# Patient Record
(demographics unavailable — no encounter records)

---

## 2024-11-12 NOTE — REVIEW OF SYSTEMS
[Fatigue] : no fatigue [Dysphagia] : no dysphagia [Chest Pain] : no chest pain [Palpitations] : no palpitations [Shortness Of Breath] : no shortness of breath [Polyuria] : no polyuria [Polydipsia] : no polydipsia

## 2024-11-12 NOTE — HISTORY OF PRESENT ILLNESS
[FreeTextEntry1] : 77 y.o. Female with morbid obesity and PMHx of T2D, CAD, s/p PCI x 6, HLD, follows for DM management. Extensive FHx of Obesity, DM, CAD  Interval history: feeling well  T2DM- over 10 years Diet controlled and never required meds for diabetes HGA1C 7.0% Current FS: 108 banana 1 hour ago does not check glucose at home   Hypothyroidism (post MARTINEZ for ? graves - over 20 years ago) Levothyrxoine 112 mcg daily Patient advised to take every day in the morning, on an empty stomach, and with no other medications.   MNG s/p FNA 9/2023 - benign RMP nodule 2.4 x 1.6 x 2.3 cm, S/p FNA 8/2023 - benign There is another one in RMP 1.9 x 0.9 x 1.3 cm TR3  HLD LDL at goal On zetia On atorvastatin 80 mg daily  Vit D Def On no supplement No updated levels on labs  Vit B Def On b12 injections via hematology No updated levels on labs

## 2024-11-12 NOTE — ASSESSMENT
[Levothyroxine] : The patient was instructed to take Levothyroxine on an empty stomach, separate from vitamins, and wait at least 30 minutes before eating [FreeTextEntry1] : T2DM -Ok to maintain diet controlled -Explained to pt she must watch diet and increase exercise efforts   MNG s/p benign FNA -WIll continue to monitor annual sonograms -Due now for repeat u/s  Hypothyroidism -Continue LT4 112 mcg daily Patient advised to take every day in the morning, on an empty stomach, and with no other medications.  Continue zetia and statin    RTO 3 months NP and 6 months MD

## 2024-12-02 NOTE — ASSESSMENT
[FreeTextEntry1] : Check urinalysis  and urine culture. Start antibiotics. Increase fluids. Hygiene reviewed in regards to the bathroom and sexual intercourse. Monitor for worse symptoms of fever, chills, dysuria, hematuria, nausea, vomiting or back pain. Call the office or go the ER if worse.

## 2024-12-02 NOTE — REVIEW OF SYSTEMS
[Fatigue] : fatigue [Dysuria] : dysuria [Nocturia] : nocturia [Frequency] : frequency [FreeTextEntry8] : burning  blood in urine

## 2024-12-02 NOTE — HISTORY OF PRESENT ILLNESS
[FreeTextEntry8] : patient c/o UTI X 24 hours  burning upon urination, pressure, back pain, bloating, frequent trips to the restroom, very little output, blood when wiping  + chills   + nauseu mild

## 2024-12-02 NOTE — PHYSICAL EXAM
[Normal] : normal rate, regular rhythm, normal S1 and S2 and no murmur heard [de-identified] : + tender

## 2025-02-03 NOTE — REVIEW OF SYSTEMS
[Recent Weight Loss (___ Lbs)] : recent weight loss: [unfilled] lbs [Lower Ext Edema] : lower extremity edema [Fatigue] : no fatigue [Recent Weight Gain (___ Lbs)] : no recent weight gain [Visual Field Defect] : no visual field defect [Blurred Vision] : no blurred vision [Dysphagia] : no dysphagia [Neck Pain] : no neck pain [Dysphonia] : no dysphonia [Chest Pain] : no chest pain [Shortness Of Breath] : no shortness of breath [Constipation] : no constipation [Diarrhea] : no diarrhea [Polyuria] : no polyuria [Polydipsia] : no polydipsia [FreeTextEntry9] : +leg and feet pain

## 2025-02-03 NOTE — ASSESSMENT
[FreeTextEntry1] : T2DM -Need updated HGA1C -Unsure if diet controlled santosh continue to be appropriate -Explained to pt she must watch diet and advocate if she continues to require steroids/injections   MNG s/p benign FNA -WIll continue to monitor annual sonograms  Hypothyroidism -Continue LT4 112 mcg daily Patient advised to take every day in the morning, on an empty stomach, and with no other medications.  Continue zetia and statin  Need updated vit d and vit b levels- ok to do with next labs for 2025-once a year  Referall for podiatry given  Fasting labs due now RTO 3 months MD

## 2025-02-03 NOTE — HISTORY OF PRESENT ILLNESS
[FreeTextEntry1] : Interval history:  C.O leg edema States both vascular/ cardiology are aware of this Causing significant leg and feet pain  Suffering from rotator cuff tears, doesn't want surgery No longer doing PT  T2DM- over 10 years Diet controlled and never required meds for diabetes  HGA1C 7.0- 11/2024 FS in office 112- had a bagel this morning Morbidly obese 8/2024- denies DR   Hypothyroidism (post MARTINEZ for ? graves - over 20 years ago) Levothyrxoine 112 mcg daily Patient advised to take every day in the morning, on an empty stomach, and with no other medications.  Need updated TFTs  MNG s/p FNA 12/2024- benign  HLD LDL at goal On zetia On atorvastatin 80 mg daily  Vit D Def On no supplement No updated levels on labs  Vit B Def  On b12 injections via hematology  No updated levels on labs

## 2025-02-13 NOTE — PHYSICAL EXAM
[TextEntry] : The lesser toes on both feet were contracted. The toes were more rigid on the right foot than the left side.  Dorsalis pedis and posterior tibial pulses were palpable and equal bilat.   The capillary return was instant bilat.  A neurological examination including deep tendon reflexes, Light Touch response, Vibratory response with a 126 hertz tuning fork over the karis prominences of each foot, Sharp/Dull Response and [place]-Brad[place] 5.07 Monofilament for digital sensation of nerve endings in the feet were found to be within normal limits.  On the left lower leg and ankle exhibited +2/4 non-pitting edema.

## 2025-02-13 NOTE — ASSESSMENT
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 Type 2 diabetes.  Plan: Dorsal/plantar and lateral weightbearing x-ray views were taken of each foot.  They revealed shortened first metatarsals with increases in the first interspace intermetatarsal angles and hypertrophy of the first metatarsals.  There was diminished first MPJ space.  The proximal IPJs of the lesser toes were hypertrophic with diminished joint spaces.  Elongated middle phalanges were noted on the 2nd, 3rd & 4th toes bilat.  The patient was instructed about the potential benefits of physical therapy as a conservative means of treating this condition, either independently, or as an adjunct to other conservative treatment.  The patient was instructed that physical therapy is considered therapeutic when performed 2-3 times per week.  Treatment regimens such as, injection therapy, strapping, oral anti-inflammatory medications and the usage of orthotics were all discussed with the patient in order to establish an appropriate treatment plan with the patient's input.  I recommended meloxicam; however, the patient did not want to take that at this time.  I then counseled the patient on performing self-examination of the feet on a daily basis.  I explained the importance of this due to the diminished sensation and the greater susceptibility of getting an infection and having additional complications due to the medical condition that exists, especially if they lack protective sensation in their feet.  I advised the patient to notify the office right away if increased redness, swelling, pain, open wounds or discharge were observed.  I explained the importance of checking the glucose regularly and of keeping the glucose level between 90 -110 and more importantly controlling the HbA1c keeping consistent and trying to achieve as close to normal an HbA1 as possible around 6.0.  I told the patient to notify the MD if the glucose level changed to above or below those levels.  The patient is to continue to take the Tylenol as needed.  She was instructed to call with any problems or questions and   return in two months.

## 2025-02-13 NOTE — HISTORY OF PRESENT ILLNESS
[FreeTextEntry1] : The patient presented for her initial visit complaining of pain on both feet.   She stated that she has had the pain for years and she stated that the pain is a 6/10.   She currently takes Tylenol one tablet twice a week before going to bed.    She states that she has diabetes and the PA at her endocrinologist recommended she see a podiatrist.    She also does get some burning and tenderness on the bottom of her feet when she is standing for a while.    She stated that she had swelling on her left lower leg and ankle.  I questioned about her glucose level.   She stated that her HbA1c was 7.0.

## 2025-02-26 NOTE — ASSESSMENT
[FreeTextEntry1] : Advised symptoms suggest of viral infection. No ABX is warranted. Symptoms may worsen before they improve. Symptoms should resolve in 7- 10 days. Cough may last 2 weeks. Wash hands frequently. Cover cough. If symptoms do not improve after 10 days, then return to office.

## 2025-02-26 NOTE — PHYSICAL EXAM
[No Respiratory Distress] : no respiratory distress  [No Accessory Muscle Use] : no accessory muscle use [Clear to Auscultation] : lungs were clear to auscultation bilaterally [Dry Cough] : a dry cough was heard [Normal] : normal rate, regular rhythm, normal S1 and S2 and no murmur heard [Coordination Grossly Intact] : coordination grossly intact [No Focal Deficits] : no focal deficits [Normal Gait] : normal gait [Normal Affect] : the affect was normal [Alert and Oriented x3] : oriented to person, place, and time [Normal Insight/Judgement] : insight and judgment were intact

## 2025-02-26 NOTE — REVIEW OF SYSTEMS
[Fever] : no fever [Chills] : chills [Fatigue] : fatigue [Nasal Discharge] : nasal discharge [Postnasal Drip] : postnasal drip [Shortness Of Breath] : no shortness of breath [Wheezing] : no wheezing [Cough] : cough [Dyspnea on Exertion] : dyspnea on exertion

## 2025-02-26 NOTE — HISTORY OF PRESENT ILLNESS
[FreeTextEntry8] : pt c/o st +ear pressure +dry cough +congestion x 3 days, home covid test negative

## 2025-06-10 NOTE — ASSESSMENT
[FreeTextEntry1] : 78 y.o. Female with morbid obesity and PMHx of T2D, CAD, s/p PCI x 6, HLD, follows for DM management. Former patient of Dr. Brown. Has extensive FHx of Obesity, DM, CAD  # T2D (Dx over 10y) in the setting of obesity Diet control. Never required Tx A1C 6.9%<== 7% stable Patient declines any Tx at this point.  Discussed dietary and lifestyle modifications. Advised regular walks for at least 30 min 3 x week.  Patient declines GLP-1ra or referral to wt loss clinic.    # Hypothyroidism Reportedly s/p MARTINEZ for Graves >20 y.ago On stable dose of Levothyroxine 112 mcg daily Clinically euthyroid  Continue current regimen.   # MNG RMP nodule 2.4 x 1.6 x 2.3 cm, S/p FNA  8/2023 - benign RLP 1.4 x 0.8 x 0.6 cm TR3 - new. S/p FNA 12/2024 - benign pathology Repeat US after 12/2025  # HLD Continue Atorvastatin and Zetia Advised Low fat/cholesterol diet  Follow up in 4 months with NP F/u with me in 6 - 7months.

## 2025-06-10 NOTE — HISTORY OF PRESENT ILLNESS
[FreeTextEntry1] : 76 y.o. Female with morbid obesity and PMHx of T2D,  CAD, s/p PCI x 6, HLD, follows for DM management. Transfers care from Dr. Brown. Last seen by Marie Goel NP. Extensive FHx of Obesity, DM, CAD

## 2025-06-10 NOTE — PHYSICAL EXAM
[Alert] : alert [Obese] : obese [No Acute Distress] : no acute distress [Normal Voice/Communication] : normal voice communication [PERRL] : pupils equal, round and reactive to light [Normal Hearing] : hearing was normal [No Neck Mass] : no neck mass was observed [Thyroid Not Enlarged] : the thyroid was not enlarged [No Thyroid Nodules] : no palpable thyroid nodules [No Respiratory Distress] : no respiratory distress [Clear to Auscultation] : lungs were clear to auscultation bilaterally [Normal Rate] : heart rate was normal [Regular Rhythm] : with a regular rhythm [No Edema] : no peripheral edema [Normal Bowel Sounds] : normal bowel sounds [Soft] : abdomen soft [Normal Supraclavicular Nodes] : no supraclavicular lymphadenopathy [Normal Anterior Cervical Nodes] : no anterior cervical lymphadenopathy [No Clubbing, Cyanosis] : no clubbing  or cyanosis of the fingernails [No Tremors] : no tremors [Normal Reflexes] : deep tendon reflexes were 2+ and symmetric [Oriented x3] : oriented to person, place, and time [Normal Affect] : the affect was normal [Normal Mood] : the mood was normal [Normal Insight/Judgement] : insight and judgment were intact [Acanthosis Nigricans] : no acanthosis nigricans [de-identified] : Obese